# Patient Record
Sex: FEMALE | Race: WHITE | Employment: OTHER | URBAN - METROPOLITAN AREA
[De-identification: names, ages, dates, MRNs, and addresses within clinical notes are randomized per-mention and may not be internally consistent; named-entity substitution may affect disease eponyms.]

---

## 2021-10-27 ENCOUNTER — TRANSCRIBE ORDER (OUTPATIENT)
Dept: SCHEDULING | Age: 71
End: 2021-10-27

## 2021-10-27 DIAGNOSIS — Z72.0 TOBACCO ABUSE: Primary | ICD-10-CM

## 2021-10-27 DIAGNOSIS — Z85.3 HX OF BREAST CANCER: Primary | ICD-10-CM

## 2021-10-27 DIAGNOSIS — Z12.2 SCREENING FOR LUNG CANCER: ICD-10-CM

## 2021-10-27 DIAGNOSIS — Z12.31 ENCOUNTER FOR SCREENING MAMMOGRAM FOR MALIGNANT NEOPLASM OF BREAST: ICD-10-CM

## 2021-10-28 ENCOUNTER — TRANSCRIBE ORDER (OUTPATIENT)
Dept: SCHEDULING | Age: 71
End: 2021-10-28

## 2021-10-28 DIAGNOSIS — Z72.0 TOBACCO ABUSE: Primary | ICD-10-CM

## 2021-10-28 DIAGNOSIS — Z12.2 SCREENING FOR MALIGNANT NEOPLASM OF RESPIRATORY ORGAN: ICD-10-CM

## 2021-12-21 ENCOUNTER — HOSPITAL ENCOUNTER (OUTPATIENT)
Dept: MAMMOGRAPHY | Age: 71
Discharge: HOME | End: 2021-12-21
Attending: FAMILY MEDICINE
Payer: MEDICARE

## 2021-12-21 ENCOUNTER — HOSPITAL ENCOUNTER (OUTPATIENT)
Dept: CT IMAGING | Age: 71
Discharge: HOME | End: 2021-12-21
Attending: FAMILY MEDICINE
Payer: MEDICARE

## 2021-12-21 VITALS — HEIGHT: 61 IN | BODY MASS INDEX: 29.83 KG/M2 | WEIGHT: 158 LBS

## 2021-12-21 DIAGNOSIS — Z12.2 SCREENING FOR MALIGNANT NEOPLASM OF RESPIRATORY ORGAN: ICD-10-CM

## 2021-12-21 DIAGNOSIS — Z72.0 TOBACCO ABUSE: ICD-10-CM

## 2021-12-21 DIAGNOSIS — Z12.31 ENCOUNTER FOR SCREENING MAMMOGRAM FOR MALIGNANT NEOPLASM OF BREAST: ICD-10-CM

## 2021-12-21 DIAGNOSIS — Z85.3 HX OF BREAST CANCER: ICD-10-CM

## 2021-12-21 PROCEDURE — 71271 CT THORAX LUNG CANCER SCR C-: CPT

## 2021-12-21 PROCEDURE — 77063 BREAST TOMOSYNTHESIS BI: CPT

## 2022-01-31 ENCOUNTER — TRANSCRIBE ORDER (OUTPATIENT)
Dept: GENERAL RADIOLOGY | Age: 72
End: 2022-01-31

## 2022-01-31 ENCOUNTER — HOSPITAL ENCOUNTER (OUTPATIENT)
Dept: GENERAL RADIOLOGY | Age: 72
Discharge: HOME | End: 2022-01-31

## 2022-01-31 DIAGNOSIS — M79.651 RIGHT THIGH PAIN: ICD-10-CM

## 2022-01-31 DIAGNOSIS — M79.651 RIGHT THIGH PAIN: Primary | ICD-10-CM

## 2022-04-07 ENCOUNTER — OFFICE VISIT (OUTPATIENT)
Dept: CARDIOLOGY CLINIC | Age: 72
End: 2022-04-07
Payer: MEDICARE

## 2022-04-07 ENCOUNTER — TELEPHONE (OUTPATIENT)
Dept: CARDIOLOGY CLINIC | Age: 72
End: 2022-04-07

## 2022-04-07 VITALS
OXYGEN SATURATION: 96 % | WEIGHT: 165 LBS | HEIGHT: 61 IN | SYSTOLIC BLOOD PRESSURE: 120 MMHG | BODY MASS INDEX: 31.15 KG/M2 | HEART RATE: 88 BPM | RESPIRATION RATE: 18 BRPM | DIASTOLIC BLOOD PRESSURE: 76 MMHG

## 2022-04-07 DIAGNOSIS — E78.2 MIXED HYPERLIPIDEMIA: Primary | ICD-10-CM

## 2022-04-07 DIAGNOSIS — I44.7 LBBB (LEFT BUNDLE BRANCH BLOCK): ICD-10-CM

## 2022-04-07 PROCEDURE — 99204 OFFICE O/P NEW MOD 45 MIN: CPT | Performed by: SPECIALIST

## 2022-04-07 PROCEDURE — 1090F PRES/ABSN URINE INCON ASSESS: CPT | Performed by: SPECIALIST

## 2022-04-07 RX ORDER — OMEPRAZOLE 20 MG/1
CAPSULE, DELAYED RELEASE ORAL
COMMUNITY
Start: 2022-04-04

## 2022-04-07 RX ORDER — IBUPROFEN 200 MG
200 CAPSULE ORAL AS NEEDED
COMMUNITY

## 2022-04-07 RX ORDER — BUPROPION HYDROCHLORIDE 300 MG/1
TABLET ORAL
COMMUNITY
Start: 2022-01-19

## 2022-04-07 RX ORDER — ATORVASTATIN CALCIUM 10 MG/1
20 TABLET, FILM COATED ORAL DAILY
COMMUNITY
Start: 2022-01-19

## 2022-04-07 RX ORDER — ACETAMINOPHEN 500 MG
500 TABLET ORAL AS NEEDED
COMMUNITY

## 2022-04-07 RX ORDER — ESCITALOPRAM OXALATE 10 MG/1
TABLET ORAL
COMMUNITY
Start: 2022-01-19

## 2022-04-07 NOTE — PROGRESS NOTES
HISTORY OF PRESENT ILLNESS  Lexie Stanley is a 67 y.o. female     SUMMARY:   Problem List  Date Reviewed: 4/7/2022          Codes Class Noted    Mixed hyperlipidemia ICD-10-CM: E78.2  ICD-9-CM: 272.2  4/7/2022              Current Outpatient Medications on File Prior to Visit   Medication Sig    acetaminophen (TYLENOL) 500 mg tablet Take 500 mg by mouth as needed.  atorvastatin (LIPITOR) 10 mg tablet Take 20 mg by mouth daily.  buPROPion XL (WELLBUTRIN XL) 300 mg XL tablet     escitalopram oxalate (LEXAPRO) 10 mg tablet     omeprazole (PRILOSEC) 20 mg capsule     ibuprofen 200 mg cap Take 200 mg by mouth as needed. No current facility-administered medications on file prior to visit. CARDIOLOGY STUDIES TO DATE:  No specialty comments available. Chief Complaint   Patient presents with    New Patient     HPI :  She is referred by her primary care for cardiac evaluation. About 5 or 6 years ago when they were living in [place] she was found to have a left bundle branch block and it sounds like they did an echocardiogram and a Lexiscan on her all of which was normal.  She has never had any cardiac type symptoms. She recently had a low-dose CT of the chest for lung cancer screening and the report indicated she had some epicardial fat and minimal coronary calcification. She is always been a light smoker currently smokes about 2 cigarettes a day. There is no history hypertension or diabetes she has hyperlipidemia and family history is negative for premature coronary disease. She gets some 20 to 30 minutes of walking about 3 times a week with no problems suggestive of angina or heart failure. She does have known emphysema and if she pushes herself she will get short of breath.   CARDIAC ROS:   negative for chest pain, palpitations, syncope, orthopnea, paroxysmal nocturnal dyspnea, exertional chest pressure/discomfort, claudication, lower extremity edema    Family History   Problem Relation Age of Onset    Heart Disease Sister         afib    Heart Failure Brother         transplant       Past Medical History:   Diagnosis Date    Breast cancer ([facility] [address] 1996    LEFT       GENERAL ROS:  A comprehensive review of systems was negative except for that written in the HPI. Visit Vitals  /76 (BP 1 Location: Left upper arm, BP Patient Position: Sitting, BP Cuff Size: Adult)   Pulse 88   Resp 18   Ht 5' 1\" (1.549 m)   Wt 165 lb (74.8 kg)   SpO2 96%   BMI 31.18 kg/m²       Wt Readings from Last 3 Encounters:   04/07/22 165 lb (74.8 kg)   12/21/21 158 lb (71.7 kg)            BP Readings from Last 3 Encounters:   04/07/22 120/76       PHYSICAL EXAM  General appearance: alert, cooperative, no distress, appears stated age  Neurologic: Alert and oriented X 3  Neck: supple, symmetrical, trachea midline, no adenopathy, no carotid bruit and no JVD  Lungs: clear to auscultation bilaterally  Heart: regular rate and rhythm, S1, S2 normal, no murmur, click, rub or gallop  Abdomen: soft, non-tender. Bowel sounds normal. No masses,  no organomegaly  Extremities: extremities normal, atraumatic, no cyanosis or edema  Pulses: 2+ and symmetric      ASSESSMENT :      She is stable and I think asymptomatic from a cardiac perspective with minimal risk factors and no worrisome symptoms. At this point I do not think she needs any specific cardiac testing. We will try to get some information from her primary care. We talked at length about symptoms that would prompt an urgent return visit here or call to 911. current treatment plan is effective, no change in therapy  lab results and schedule of future lab studies reviewed with patient  reviewed diet, exercise and weight control    Encounter Diagnoses   Name Primary?     Mixed hyperlipidemia Yes    LBBB (left bundle branch block)      Orders Placed This Encounter    acetaminophen (TYLENOL) 500 mg tablet    atorvastatin (LIPITOR) 10 mg tablet    buPROPion XL (WELLBUTRIN XL) 300 mg XL tablet    escitalopram oxalate (LEXAPRO) 10 mg tablet    omeprazole (PRILOSEC) 20 mg capsule    ibuprofen 200 mg cap       Follow-up and Dispositions    · Return if symptoms worsen or fail to improve. Anthony Alegre MD  4/7/2022  Please note that this dictation was completed with [employer], the computer voice recognition software. Quite often unanticipated grammatical, syntax, homophones, and other interpretive errors are inadvertently transcribed by the computer software. Please disregard these errors. Please excuse any errors that have escaped final proofreading. Thank you.

## 2022-04-11 PROBLEM — E78.2 MIXED HYPERLIPIDEMIA: Status: ACTIVE | Noted: 2022-04-07

## 2022-08-29 ENCOUNTER — HOSPITAL ENCOUNTER (EMERGENCY)
Age: 72
Discharge: HOME | End: 2022-08-29
Attending: STUDENT IN AN ORGANIZED HEALTH CARE EDUCATION/TRAINING PROGRAM
Payer: MEDICARE

## 2022-08-29 ENCOUNTER — APPOINTMENT (OUTPATIENT)
Dept: GENERAL RADIOLOGY | Age: 72
End: 2022-08-29
Attending: EMERGENCY MEDICINE
Payer: MEDICARE

## 2022-08-29 VITALS
WEIGHT: 165 LBS | HEIGHT: 62 IN | RESPIRATION RATE: 18 BRPM | SYSTOLIC BLOOD PRESSURE: 98 MMHG | DIASTOLIC BLOOD PRESSURE: 73 MMHG | HEART RATE: 93 BPM | OXYGEN SATURATION: 94 % | TEMPERATURE: 98 F | BODY MASS INDEX: 30.36 KG/M2

## 2022-08-29 DIAGNOSIS — I44.7 LEFT BUNDLE BRANCH BLOCK: ICD-10-CM

## 2022-08-29 DIAGNOSIS — R07.9 ACUTE CHEST PAIN: Primary | ICD-10-CM

## 2022-08-29 LAB
ALBUMIN SERPL-MCNC: 3.4 G/DL (ref 3.5–5)
ALBUMIN/GLOB SERPL: 0.9 {RATIO} (ref 1.1–2.2)
ALP SERPL-CCNC: 81 U/L (ref 45–117)
ALT SERPL-CCNC: 13 U/L (ref 12–78)
ANION GAP SERPL CALC-SCNC: 2 MMOL/L (ref 5–15)
AST SERPL-CCNC: 17 U/L (ref 15–37)
ATRIAL RATE: 72 BPM
BASOPHILS # BLD: 0.1 K/UL (ref 0–0.1)
BASOPHILS NFR BLD: 1 % (ref 0–1)
BILIRUB SERPL-MCNC: 0.5 MG/DL (ref 0.2–1)
BNP SERPL-MCNC: 86 PG/ML
BUN SERPL-MCNC: 11 MG/DL (ref 6–20)
BUN/CREAT SERPL: 16 (ref 12–20)
CALCIUM SERPL-MCNC: 8.8 MG/DL (ref 8.5–10.1)
CALCULATED P AXIS, ECG09: 70 DEGREES
CALCULATED R AXIS, ECG10: -21 DEGREES
CALCULATED T AXIS, ECG11: 105 DEGREES
CHLORIDE SERPL-SCNC: 107 MMOL/L (ref 97–108)
CO2 SERPL-SCNC: 31 MMOL/L (ref 21–32)
CREAT SERPL-MCNC: 0.67 MG/DL (ref 0.55–1.02)
DIAGNOSIS, 93000: NORMAL
DIFFERENTIAL METHOD BLD: NORMAL
EOSINOPHIL # BLD: 0.1 K/UL (ref 0–0.4)
EOSINOPHIL NFR BLD: 2 % (ref 0–7)
ERYTHROCYTE [DISTWIDTH] IN BLOOD BY AUTOMATED COUNT: 13.2 % (ref 11.5–14.5)
GLOBULIN SER CALC-MCNC: 3.8 G/DL (ref 2–4)
GLUCOSE SERPL-MCNC: 94 MG/DL (ref 65–100)
HCT VFR BLD AUTO: 40.5 % (ref 35–47)
HGB BLD-MCNC: 12.9 G/DL (ref 11.5–16)
IMM GRANULOCYTES # BLD AUTO: 0 K/UL (ref 0–0.04)
IMM GRANULOCYTES NFR BLD AUTO: 0 % (ref 0–0.5)
LYMPHOCYTES # BLD: 1.9 K/UL (ref 0.8–3.5)
LYMPHOCYTES NFR BLD: 27 % (ref 12–49)
MCH RBC QN AUTO: 29.9 PG (ref 26–34)
MCHC RBC AUTO-ENTMCNC: 31.9 G/DL (ref 30–36.5)
MCV RBC AUTO: 93.8 FL (ref 80–99)
MONOCYTES # BLD: 0.5 K/UL (ref 0–1)
MONOCYTES NFR BLD: 8 % (ref 5–13)
NEUTS SEG # BLD: 4.4 K/UL (ref 1.8–8)
NEUTS SEG NFR BLD: 62 % (ref 32–75)
NRBC # BLD: 0 K/UL (ref 0–0.01)
NRBC BLD-RTO: 0 PER 100 WBC
P-R INTERVAL, ECG05: 148 MS
PLATELET # BLD AUTO: 286 K/UL (ref 150–400)
PMV BLD AUTO: 9.9 FL (ref 8.9–12.9)
POTASSIUM SERPL-SCNC: 4 MMOL/L (ref 3.5–5.1)
PROT SERPL-MCNC: 7.2 G/DL (ref 6.4–8.2)
Q-T INTERVAL, ECG07: 436 MS
QRS DURATION, ECG06: 142 MS
QTC CALCULATION (BEZET), ECG08: 477 MS
RBC # BLD AUTO: 4.32 M/UL (ref 3.8–5.2)
SODIUM SERPL-SCNC: 140 MMOL/L (ref 136–145)
TROPONIN-HIGH SENSITIVITY: 5 NG/L (ref 0–51)
VENTRICULAR RATE, ECG03: 72 BPM
WBC # BLD AUTO: 7.1 K/UL (ref 3.6–11)

## 2022-08-29 PROCEDURE — 85025 COMPLETE CBC W/AUTO DIFF WBC: CPT

## 2022-08-29 PROCEDURE — 71046 X-RAY EXAM CHEST 2 VIEWS: CPT

## 2022-08-29 PROCEDURE — 99285 EMERGENCY DEPT VISIT HI MDM: CPT

## 2022-08-29 PROCEDURE — 84484 ASSAY OF TROPONIN QUANT: CPT

## 2022-08-29 PROCEDURE — 93005 ELECTROCARDIOGRAM TRACING: CPT

## 2022-08-29 PROCEDURE — 80053 COMPREHEN METABOLIC PANEL: CPT

## 2022-08-29 PROCEDURE — 36415 COLL VENOUS BLD VENIPUNCTURE: CPT

## 2022-08-29 PROCEDURE — 83880 ASSAY OF NATRIURETIC PEPTIDE: CPT

## 2022-08-29 NOTE — ED PROVIDER NOTES
EMERGENCY DEPARTMENT HISTORY AND PHYSICAL EXAM      Date: 8/29/2022  Patient Name: Lazara Lara    History of Presenting Illness     Chief Complaint   Patient presents with    Chest Pain     Reports acute onset of chest pain that radiated from center of her back that happened about an hour ago at her PCP office. Denied currently feeling SOB or dizziness. Hx of GERD. History Provided By: Patient    HPI: Lazara Lara, 67 y.o. female with a past medical history significant for lipidemia, left bundle branch block presents to the ED with cc of pain. She notes she was at a doctor's appointment approximate 10:00 this morning and had CAD acute onset epigastric pain rating into her back with associated shortness of breath. The pain is constant and last for 30 minutes and then went away on its own. She notes no complaints at the moment. She says she had this approximately 30 years ago but nothing recently. She has seen cardiologist in the past and has a known left bundle branch block. She was referred to emergency department for evaluation. She notes she has been feeling okay. She was recently diagnosed with GERD and is on omeprazole which is helping. She denies any current chest pain, shortness of breath, cough, palpitations. Smokes 3 cigarettes/day and does not drink or do drugs. There are no other complaints, changes, or physical findings at this time. PCP: Andra Espino MD    No current facility-administered medications on file prior to encounter. Current Outpatient Medications on File Prior to Encounter   Medication Sig Dispense Refill    acetaminophen (TYLENOL) 500 mg tablet Take 500 mg by mouth as needed. atorvastatin (LIPITOR) 10 mg tablet Take 20 mg by mouth daily. buPROPion XL (WELLBUTRIN XL) 300 mg XL tablet       escitalopram oxalate (LEXAPRO) 10 mg tablet       omeprazole (PRILOSEC) 20 mg capsule       ibuprofen 200 mg cap Take 200 mg by mouth as needed.          Past History     Past Medical History:  Past Medical History:   Diagnosis Date    Breast cancer ([facility] [address] 1996    LEFT       Past Surgical History:  Past Surgical History:   Procedure Laterality Date    HX BREAST REDUCTION Left     HX MASTECTOMY Left 1996       Family History:  Family History   Problem Relation Age of Onset    Heart Disease Sister         afib    Heart Failure Brother         transplant       Social History:  Social History     Tobacco Use    Smoking status: Light Smoker     Years: 40.00     Types: Cigarettes    Smokeless tobacco: Never    Tobacco comments:     2 cig a day   Substance Use Topics    Alcohol use: Not Currently    Drug use: Never       Allergies: Allergies   Allergen Reactions    Adhesive Unknown (comments)     Other reaction(s): Itching, Redness    Epicoccum Nigrum Allergenic Extract Unable to Obtain     FROM ALLERGIST    Fusarium Oxysporum Allergenic Extract Unknown (comments)     PER ALLERGIST FUSARIUM VASINFECTUM    Pecan Nut Unknown (comments)     Pecan tree         Review of Systems   Review of Systems   Constitutional:  Negative for fever. HENT:  Negative for congestion and sore throat. Eyes:  Negative for visual disturbance. Respiratory:  Positive for shortness of breath. Negative for cough. Cardiovascular:  Positive for chest pain. Gastrointestinal:  Negative for abdominal pain, blood in stool, constipation, diarrhea, nausea and vomiting. Genitourinary:  Negative for dysuria. Musculoskeletal:  Negative for myalgias. Skin:  Negative for rash. Neurological:  Negative for dizziness and headaches. Physical Exam   Physical Exam  Vitals reviewed. Constitutional:       Appearance: Normal appearance. HENT:      Head: Normocephalic and atraumatic. Nose: Nose normal.      Mouth/Throat:      Mouth: Mucous membranes are moist.   Eyes:      Extraocular Movements: Extraocular movements intact.       Conjunctiva/sclera: Conjunctivae normal.      Pupils: Pupils are equal, round, and reactive to light. Cardiovascular:      Rate and Rhythm: Normal rate and regular rhythm. Heart sounds: Normal heart sounds. Pulmonary:      Effort: Pulmonary effort is normal.      Breath sounds: Normal breath sounds. Abdominal:      General: Abdomen is flat. Palpations: Abdomen is soft. Musculoskeletal:         General: No swelling or deformity. Normal range of motion. Cervical back: Normal range of motion. Skin:     General: Skin is warm. Neurological:      General: No focal deficit present. Mental Status: She is alert and oriented to person, place, and time. Mental status is at baseline. Psychiatric:         Mood and Affect: Mood normal.         Thought Content: Thought content normal.       Diagnostic Study Results     Labs -     Recent Results (from the past 24 hour(s))   CBC WITH AUTOMATED DIFF    Collection Time: 08/29/22  1:23 PM   Result Value Ref Range    WBC 7.1 3.6 - 11.0 K/uL    RBC 4.32 3.80 - 5.20 M/uL    HGB 12.9 11.5 - 16.0 g/dL    HCT 40.5 35.0 - 47.0 %    MCV 93.8 80.0 - 99.0 FL    MCH 29.9 26.0 - 34.0 PG    MCHC 31.9 30.0 - 36.5 g/dL    RDW 13.2 11.5 - 14.5 %    PLATELET 620 295 - 055 K/uL    MPV 9.9 8.9 - 12.9 FL    NRBC 0.0 0  WBC    ABSOLUTE NRBC 0.00 0.00 - 0.01 K/uL    NEUTROPHILS 62 32 - 75 %    LYMPHOCYTES 27 12 - 49 %    MONOCYTES 8 5 - 13 %    EOSINOPHILS 2 0 - 7 %    BASOPHILS 1 0 - 1 %    IMMATURE GRANULOCYTES 0 0.0 - 0.5 %    ABS. NEUTROPHILS 4.4 1.8 - 8.0 K/UL    ABS. LYMPHOCYTES 1.9 0.8 - 3.5 K/UL    ABS. MONOCYTES 0.5 0.0 - 1.0 K/UL    ABS. EOSINOPHILS 0.1 0.0 - 0.4 K/UL    ABS. BASOPHILS 0.1 0.0 - 0.1 K/UL    ABS. IMM.  GRANS. 0.0 0.00 - 0.04 K/UL    DF AUTOMATED     METABOLIC PANEL, COMPREHENSIVE    Collection Time: 08/29/22  1:23 PM   Result Value Ref Range    Sodium 140 136 - 145 mmol/L    Potassium 4.0 3.5 - 5.1 mmol/L    Chloride 107 97 - 108 mmol/L    CO2 31 21 - 32 mmol/L    Anion gap 2 (L) 5 - 15 mmol/L    Glucose 94 65 - 100 mg/dL    BUN 11 6 - 20 MG/DL    Creatinine 0.67 0.55 - 1.02 MG/DL    BUN/Creatinine ratio 16 12 - 20      GFR est AA >60 >60 ml/min/1.73m2    GFR est non-AA >60 >60 ml/min/1.73m2    Calcium 8.8 8.5 - 10.1 MG/DL    Bilirubin, total 0.5 0.2 - 1.0 MG/DL    ALT (SGPT) 13 12 - 78 U/L    AST (SGOT) 17 15 - 37 U/L    Alk. phosphatase 81 45 - 117 U/L    Protein, total 7.2 6.4 - 8.2 g/dL    Albumin 3.4 (L) 3.5 - 5.0 g/dL    Globulin 3.8 2.0 - 4.0 g/dL    A-G Ratio 0.9 (L) 1.1 - 2.2     NT-PRO BNP    Collection Time: 08/29/22  1:23 PM   Result Value Ref Range    NT pro-BNP 86 <125 PG/ML   TROPONIN-HIGH SENSITIVITY    Collection Time: 08/29/22  1:23 PM   Result Value Ref Range    Troponin-High Sensitivity 5 0 - 51 ng/L       Radiologic Studies -   XR CHEST PA LAT   Final Result   No acute cardiopulmonary disease. CT Results  (Last 48 hours)      None          CXR Results  (Last 48 hours)                 08/29/22 1246  XR CHEST PA LAT Final result    Impression:  No acute cardiopulmonary disease. Narrative: Indication:  Chest Pain        Exam: PA and lateral views of the chest.       Direct comparison is made to prior CT dated December 2021. Findings: Cardiomediastinal silhouette is within normal limits. Lungs are clear   bilaterally. Pleural spaces are normal. Osseous structures are intact. Multiple   surgical clips are noted within the left breast.                     Medical Decision Making   I am the first provider for this patient. I reviewed the vital signs, available nursing notes, past medical history, past surgical history, family history and social history. Vital Signs-Reviewed the patient's vital signs.   Patient Vitals for the past 12 hrs:   Temp Pulse Resp BP SpO2   08/29/22 1214 98 °F (36.7 °C) 93 18 98/73 94 %       Records Reviewed: Nursing records and medical records reviewed    MDM:  Excludes ACS, MI, esophageal spasm, Prinzmetal's angina, angina    Provider Notes (Medical Decision Making):   69-year-old female with history of GERD, hyperlipidemia presents with resolved chest pain. Her vital signs are stable upon arrival.  She looks well on arrival and is in no acute distress. She does have a left bundle branch block on her EKG which is old. She notes she has seen cardiologist for this is a stable and nothing to do. Her chest pain is since resolved and is not worse with exertion. Suspicion for ACS or MI is low. Possible Prinzmetal's angina. Will refer to cardiology here for follow-up. Her labs are within normal limits require no acute intervention. She agrees with this plan. ED Course:   Initial assessment performed. The patients presenting problems have been discussed, and they are in agreement with the care plan formulated and outlined with them. I have encouraged them to ask questions as they arise throughout their visit. Disposition:  Discharge Note:  3:45 PM  The patient has been re-evaluated and is ready for discharge. Reviewed available results with patient. Counseled patient on diagnosis and care plan. Patient has expressed understanding, and all questions have been answered. Patient agrees with plan and agrees to follow up as recommended, or to return to the ED if their symptoms worsen. Discharge instructions have been provided and explained to the patient, along with reasons to return to the ED. DISCHARGE PLAN:  1. Current Discharge Medication List        2. Follow-up Information       Follow up With Specialties Details Why Contact Info    [facility]  Call   [address]   [phone]    [facility] EMERGENCY DEPT Emergency Medicine  If symptoms worsen [address]  [address] [facility]  [phone]          3. Return to ED if worse     Diagnosis     Clinical Impression:   1. Acute chest pain    2.  Left bundle branch block        Attestations:    Charlotte Choudhury MD    Please note that this dictation was completed with [employer], the [employer] voice recognition software. Quite often unanticipated grammatical, syntax, homophones, and other interpretive errors are inadvertently transcribed by the computer software. Please disregard these errors. Please excuse any errors that have escaped final proofreading. Thank you.

## 2023-01-06 ENCOUNTER — TRANSCRIBE ORDER (OUTPATIENT)
Dept: SCHEDULING | Age: 73
End: 2023-01-06

## 2023-01-06 DIAGNOSIS — N63.13 MASS OF LOWER OUTER QUADRANT OF RIGHT BREAST: Primary | ICD-10-CM

## 2023-01-06 DIAGNOSIS — Z00.01 ENCOUNTER FOR GENERAL ADULT MEDICAL EXAMINATION WITH ABNORMAL FINDINGS: ICD-10-CM

## 2023-01-25 ENCOUNTER — HOSPITAL ENCOUNTER (OUTPATIENT)
Dept: MAMMOGRAPHY | Age: 73
Discharge: HOME | End: 2023-01-25
Payer: MEDICARE

## 2023-01-25 DIAGNOSIS — N63.13 MASS OF LOWER OUTER QUADRANT OF RIGHT BREAST: ICD-10-CM

## 2023-01-25 PROCEDURE — 77061 BREAST TOMOSYNTHESIS UNI: CPT

## 2023-01-31 ENCOUNTER — TRANSCRIBE ORDER (OUTPATIENT)
Dept: SCHEDULING | Age: 73
End: 2023-01-31

## 2023-01-31 DIAGNOSIS — R91.8 PULMONARY NODULES: ICD-10-CM

## 2023-01-31 DIAGNOSIS — J43.1 PANLOBULAR EMPHYSEMA (HCC): ICD-10-CM

## 2023-01-31 DIAGNOSIS — Z72.0 TOBACCO ABUSE: Primary | ICD-10-CM

## 2023-01-31 DIAGNOSIS — Z00.01 ENCOUNTER FOR GENERAL ADULT MEDICAL EXAMINATION WITH ABNORMAL FINDINGS: ICD-10-CM

## 2023-02-01 ENCOUNTER — TRANSCRIBE ORDER (OUTPATIENT)
Dept: SCHEDULING | Age: 73
End: 2023-02-01

## 2023-02-01 DIAGNOSIS — M85.89 OSTEOPENIA OF MULTIPLE SITES: Primary | ICD-10-CM

## 2023-02-02 ENCOUNTER — TRANSCRIBE ORDER (OUTPATIENT)
Dept: SCHEDULING | Age: 73
End: 2023-02-02

## 2023-02-02 DIAGNOSIS — J43.1 PANLOBULAR EMPHYSEMA (HCC): ICD-10-CM

## 2023-02-02 DIAGNOSIS — Z72.0 TOBACCO ABUSE: Primary | ICD-10-CM

## 2023-02-02 DIAGNOSIS — R91.8 PULMONARY NODULES: ICD-10-CM

## 2023-02-14 ENCOUNTER — TRANSCRIBE ORDER (OUTPATIENT)
Dept: SCHEDULING | Age: 73
End: 2023-02-14

## 2023-02-14 DIAGNOSIS — J43.1 PANACINAR EMPHYSEMA (HCC): ICD-10-CM

## 2023-02-14 DIAGNOSIS — Z72.0 TOBACCO ABUSE: Primary | ICD-10-CM

## 2023-02-14 DIAGNOSIS — R91.8 LUNG MASS: ICD-10-CM

## 2023-02-16 ENCOUNTER — TRANSCRIBE ORDER (OUTPATIENT)
Dept: SCHEDULING | Age: 73
End: 2023-02-16

## 2023-02-16 DIAGNOSIS — R91.8 PULMONARY NODULES: Primary | ICD-10-CM

## 2023-02-20 ENCOUNTER — TRANSCRIBE ORDER (OUTPATIENT)
Dept: SCHEDULING | Age: 73
End: 2023-02-20

## 2023-02-20 DIAGNOSIS — F17.219 CIGARETTE NICOTINE DEPENDENCE WITH NICOTINE-INDUCED DISORDER: Primary | ICD-10-CM

## 2023-02-20 DIAGNOSIS — Z72.0 TOBACCO ABUSE: ICD-10-CM

## 2023-02-20 DIAGNOSIS — F17.200 NICOTINE DEPENDENCE: ICD-10-CM

## 2023-02-20 DIAGNOSIS — R91.8 PULMONARY NODULES: Primary | ICD-10-CM

## 2023-02-20 DIAGNOSIS — F17.200 CURRENT SMOKER: ICD-10-CM

## 2023-03-06 ENCOUNTER — HOSPITAL ENCOUNTER (OUTPATIENT)
Dept: CT IMAGING | Age: 73
Discharge: HOME | End: 2023-03-06
Attending: FAMILY MEDICINE
Payer: MEDICARE

## 2023-03-06 DIAGNOSIS — F17.219 CIGARETTE NICOTINE DEPENDENCE WITH NICOTINE-INDUCED DISORDER: ICD-10-CM

## 2023-03-06 PROCEDURE — 71271 CT THORAX LUNG CANCER SCR C-: CPT

## 2023-04-07 ENCOUNTER — HOSPITAL ENCOUNTER (OUTPATIENT)
Dept: MAMMOGRAPHY | Age: 73
End: 2023-04-07
Payer: MEDICARE

## 2023-04-22 DIAGNOSIS — M85.89 OSTEOPENIA OF MULTIPLE SITES: Primary | ICD-10-CM

## 2024-02-08 ENCOUNTER — HOSPITAL ENCOUNTER (OUTPATIENT)
Facility: HOSPITAL | Age: 74
Discharge: HOME | End: 2024-02-08
Payer: MEDICARE

## 2024-02-08 VITALS — HEIGHT: 62 IN | WEIGHT: 165 LBS | BODY MASS INDEX: 30.36 KG/M2

## 2024-02-08 DIAGNOSIS — Z12.31 SCREENING MAMMOGRAM FOR BREAST CANCER: ICD-10-CM

## 2024-02-08 PROCEDURE — 77063 BREAST TOMOSYNTHESIS BI: CPT

## 2024-03-26 ENCOUNTER — HOSPITAL ENCOUNTER (OUTPATIENT)
Facility: HOSPITAL | Age: 74
Discharge: HOME | End: 2024-03-29
Payer: MEDICARE

## 2024-03-26 DIAGNOSIS — F17.210 CIGARETTE NICOTINE DEPENDENCE, UNCOMPLICATED: ICD-10-CM

## 2024-03-26 DIAGNOSIS — Z87.891 PERSONAL HISTORY OF TOBACCO USE: ICD-10-CM

## 2024-03-26 PROCEDURE — 71271 CT THORAX LUNG CANCER SCR C-: CPT

## 2024-08-08 RX ORDER — SODIUM CHLORIDE 0.9 % (FLUSH) 0.9 %
5-40 SYRINGE (ML) INJECTION PRN
Status: DISCONTINUED | OUTPATIENT
Start: 2024-08-08 | End: 2024-08-09 | Stop reason: HOSPADM

## 2024-08-08 RX ORDER — SODIUM CHLORIDE 0.9 % (FLUSH) 0.9 %
5-40 SYRINGE (ML) INJECTION EVERY 12 HOURS SCHEDULED
Status: DISCONTINUED | OUTPATIENT
Start: 2024-08-08 | End: 2024-08-09 | Stop reason: HOSPADM

## 2024-08-08 RX ORDER — SODIUM CHLORIDE 9 MG/ML
INJECTION, SOLUTION INTRAVENOUS CONTINUOUS
Status: DISCONTINUED | OUTPATIENT
Start: 2024-08-08 | End: 2024-08-09 | Stop reason: HOSPADM

## 2024-08-08 RX ORDER — SODIUM CHLORIDE 9 MG/ML
25 INJECTION, SOLUTION INTRAVENOUS PRN
Status: DISCONTINUED | OUTPATIENT
Start: 2024-08-08 | End: 2024-08-09 | Stop reason: HOSPADM

## 2024-08-09 ENCOUNTER — ANESTHESIA (OUTPATIENT)
Facility: HOSPITAL | Age: 74
End: 2024-08-09
Payer: MEDICARE

## 2024-08-09 ENCOUNTER — ANESTHESIA EVENT (OUTPATIENT)
Facility: HOSPITAL | Age: 74
End: 2024-08-09
Payer: MEDICARE

## 2024-08-09 ENCOUNTER — HOSPITAL ENCOUNTER (OUTPATIENT)
Facility: HOSPITAL | Age: 74
Setting detail: OUTPATIENT SURGERY
Discharge: HOME | End: 2024-08-09
Attending: STUDENT IN AN ORGANIZED HEALTH CARE EDUCATION/TRAINING PROGRAM | Admitting: STUDENT IN AN ORGANIZED HEALTH CARE EDUCATION/TRAINING PROGRAM
Payer: MEDICARE

## 2024-08-09 VITALS
TEMPERATURE: 97 F | BODY MASS INDEX: 29.87 KG/M2 | SYSTOLIC BLOOD PRESSURE: 103 MMHG | HEIGHT: 61 IN | HEART RATE: 84 BPM | RESPIRATION RATE: 22 BRPM | WEIGHT: 158.2 LBS | OXYGEN SATURATION: 96 % | DIASTOLIC BLOOD PRESSURE: 54 MMHG

## 2024-08-09 PROCEDURE — 3700000000 HC ANESTHESIA ATTENDED CARE: Performed by: STUDENT IN AN ORGANIZED HEALTH CARE EDUCATION/TRAINING PROGRAM

## 2024-08-09 PROCEDURE — 7100000011 HC PHASE II RECOVERY - ADDTL 15 MIN: Performed by: STUDENT IN AN ORGANIZED HEALTH CARE EDUCATION/TRAINING PROGRAM

## 2024-08-09 PROCEDURE — 2500000003 HC RX 250 WO HCPCS

## 2024-08-09 PROCEDURE — 2580000003 HC RX 258: Performed by: STUDENT IN AN ORGANIZED HEALTH CARE EDUCATION/TRAINING PROGRAM

## 2024-08-09 PROCEDURE — 2709999900 HC NON-CHARGEABLE SUPPLY: Performed by: STUDENT IN AN ORGANIZED HEALTH CARE EDUCATION/TRAINING PROGRAM

## 2024-08-09 PROCEDURE — 88305 TISSUE EXAM BY PATHOLOGIST: CPT

## 2024-08-09 PROCEDURE — 3600007512: Performed by: STUDENT IN AN ORGANIZED HEALTH CARE EDUCATION/TRAINING PROGRAM

## 2024-08-09 PROCEDURE — 6360000002 HC RX W HCPCS

## 2024-08-09 PROCEDURE — 3700000001 HC ADD 15 MINUTES (ANESTHESIA): Performed by: STUDENT IN AN ORGANIZED HEALTH CARE EDUCATION/TRAINING PROGRAM

## 2024-08-09 PROCEDURE — 7100000010 HC PHASE II RECOVERY - FIRST 15 MIN: Performed by: STUDENT IN AN ORGANIZED HEALTH CARE EDUCATION/TRAINING PROGRAM

## 2024-08-09 PROCEDURE — 3600007502: Performed by: STUDENT IN AN ORGANIZED HEALTH CARE EDUCATION/TRAINING PROGRAM

## 2024-08-09 RX ORDER — PHENYLEPHRINE HCL IN 0.9% NACL 0.4MG/10ML
SYRINGE (ML) INTRAVENOUS PRN
Status: DISCONTINUED | OUTPATIENT
Start: 2024-08-09 | End: 2024-08-09 | Stop reason: SDUPTHER

## 2024-08-09 RX ORDER — LIDOCAINE HYDROCHLORIDE 20 MG/ML
INJECTION, SOLUTION EPIDURAL; INFILTRATION; INTRACAUDAL; PERINEURAL PRN
Status: DISCONTINUED | OUTPATIENT
Start: 2024-08-09 | End: 2024-08-09 | Stop reason: SDUPTHER

## 2024-08-09 RX ADMIN — Medication 40 MCG: at 08:41

## 2024-08-09 RX ADMIN — LIDOCAINE HYDROCHLORIDE 40 MG: 20 INJECTION, SOLUTION EPIDURAL; INFILTRATION; INTRACAUDAL; PERINEURAL at 08:36

## 2024-08-09 RX ADMIN — PROPOFOL 180 MG: 10 INJECTION, EMULSION INTRAVENOUS at 09:00

## 2024-08-09 RX ADMIN — SODIUM CHLORIDE: 9 INJECTION, SOLUTION INTRAVENOUS at 08:33

## 2024-08-09 RX ADMIN — PROPOFOL 100 MG: 10 INJECTION, EMULSION INTRAVENOUS at 08:36

## 2024-08-09 RX ADMIN — SODIUM CHLORIDE 25 ML: 9 INJECTION, SOLUTION INTRAVENOUS at 08:23

## 2024-08-09 ASSESSMENT — LIFESTYLE VARIABLES: SMOKING_STATUS: 1

## 2024-08-09 ASSESSMENT — PAIN - FUNCTIONAL ASSESSMENT: PAIN_FUNCTIONAL_ASSESSMENT: 0-10

## 2024-08-09 ASSESSMENT — PAIN DESCRIPTION - DESCRIPTORS: DESCRIPTORS: ACHING

## 2024-08-09 NOTE — DISCHARGE INSTRUCTIONS
[facility]  GRICEL Canales D.O.  [phone]            COLONOSCOPY DISCHARGE INSTRUCTIONS    Komal Merida  [id number]  1950    DISCOMFORT:  Redness at IV site- apply warm compress to area; if redness or soreness persist- contact your physician  There may be a slight amount of blood passed from the rectum  Gaseous discomfort- walking, belching will help relieve any discomfort  You may not operate a vehicle for 12 hours  You may not engage in an occupation involving machinery or appliances for the  rest of today  You may not drink alcoholic beverages for at least 12 hours  Avoid making any critical decisions for at least 24 hours    DIET:   You may resume your normal diet, but some patients find that heavy or large meals may lead to indigestion or vomiting. I suggest a light meal as first food intake.    ACTIVITY:  You may resume your normal daily activities.  It is recommended that you spend the remainder of the day resting - avoid any strenuous activity.    CALL M.D. IF ANY SIGN OF:   Increasing pain, nausea, vomiting  Abdominal distension (swelling)  Significant bleeding (oral or rectal)  Fever   Pain in chest area  Shortness of breath    Additional Instructions:   Call Dr. Canales if any questions or problems at [phone]   You should receive the biopsy results by phone or mail within 3 weeks, if not, call  my office for the results      Colonoscopy showed 4 small benign appearing polyps that were received.    Repeat screening colonoscopy not recommended due to age, but can be considered if any symptoms develop, I.e. rectal bleeding, anemia, diarrhea, change in bowel habits, etc.    Resume regular medications    Follow-up at your convenience.      GRICEL Canales D.O.  [facility]    Patient Education on Sedation / Analgesia Administered for Procedure      For 24 hours after general anesthesia or intravenous analgesia / sedation:  Have  someone responsible help you with your care  Limit your activities  Do not drive and operate hazardous machinery  Do not make important personal, legal or business decisions  Do not drink alcoholic beverages  If you have not urinated within 8 hours after discharge, please contact your physician  Resume your medications unless otherwise instructed    For 24 hours after general anesthesia or intravenous analgesia / sedation  you may experience:  Drowsiness, dizziness, sleepiness, or confusion  Difficulty remembering or delayed reaction times  Difficulty with your balance, especially while walking, move slowly and carefully, do not make sudden position changes  Difficulty focusing or blurred vision    You may not be aware of slight changes in your behavior and/or your reaction time because of the medication used during and after your procedure.    Report the following to your physician:  Excessive pain, swelling, redness or odor of or around the surgical area  Temperature over 100.5  Nausea and vomiting lasting longer than 4 hours or if unable to take medications  Any signs of decreased circulation or nerve impairment to extremity: change in color, persistent numbness, tingling, coldness or increase pain  Any questions or concerns    IF YOU REPORT TO AN EMERGENCY ROOM, DOCTOR'S OFFICE OR HOSPITAL WITHIN 24 HOURS AFTER YOUR PROCEDURE, BRING THIS SHEET AND YOUR AFTER VISIT SUMMARY WITH YOU AND GIVE IT TO THE PHYSICIAN OR NURSE ATTENDING YOU.

## 2024-08-09 NOTE — PROGRESS NOTES
Endoscope was pre-cleaned at bedside immediately following procedure by THOMAS Matthew    Glasses returned to patient post-procedure. Dentures remained in place intra-procedure.

## 2024-08-09 NOTE — ANESTHESIA PRE PROCEDURE
BMI:   Wt Readings from Last 3 Encounters:   02/08/24 74.8 kg (165 lb)   04/07/22 74.8 kg (165 lb)     There is no height or weight on file to calculate BMI.    CBC:   Lab Results   Component Value Date/Time    WBC 7.1 08/29/2022 01:23 PM    RBC 4.32 08/29/2022 01:23 PM    HGB 12.9 08/29/2022 01:23 PM    HCT 40.5 08/29/2022 01:23 PM    MCV 93.8 08/29/2022 01:23 PM    RDW 13.2 08/29/2022 01:23 PM     08/29/2022 01:23 PM       CMP:   Lab Results   Component Value Date/Time     08/29/2022 01:23 PM    K 4.0 08/29/2022 01:23 PM     08/29/2022 01:23 PM    CO2 31 08/29/2022 01:23 PM    BUN 11 08/29/2022 01:23 PM    CREATININE 0.67 08/29/2022 01:23 PM    GFRAA >60 08/29/2022 01:23 PM    AGRATIO 0.9 08/29/2022 01:23 PM    GLUCOSE 94 08/29/2022 01:23 PM    CALCIUM 8.8 08/29/2022 01:23 PM    BILITOT 0.5 08/29/2022 01:23 PM    ALKPHOS 81 08/29/2022 01:23 PM    AST 17 08/29/2022 01:23 PM    ALT 13 08/29/2022 01:23 PM       POC Tests: No results for input(s): \"POCGLU\", \"POCNA\", \"POCK\", \"POCCL\", \"POCBUN\", \"POCHEMO\", \"POCHCT\" in the last 72 hours.    Coags: No results found for: \"PROTIME\", \"INR\", \"APTT\"    HCG (If Applicable): No results found for: \"PREGTESTUR\", \"PREGSERUM\", \"HCG\", \"HCGQUANT\"     ABGs: No results found for: \"PHART\", \"PO2ART\", \"NQD5WBS\", \"HWB4LRD\", \"BEART\", \"S6GNAVIT\"     Type & Screen (If Applicable):  No results found for: \"LABABO\"    Drug/Infectious Status (If Applicable):  No results found for: \"HIV\", \"HEPCAB\"    COVID-19 Screening (If Applicable): No results found for: \"COVID19\"        Anesthesia Evaluation  Patient summary reviewed and Nursing notes reviewed  Airway: Mallampati: III       Mouth opening: > = 3 FB   Dental: normal exam         Pulmonary:Negative Pulmonary ROS and normal exam  breath sounds clear to auscultation  (+)           current smoker          Patient smoked on day of surgery.                 Cardiovascular:Negative CV ROS            Rhythm: regular  Rate:

## 2024-08-09 NOTE — ANESTHESIA POSTPROCEDURE EVALUATION
Department of Anesthesiology  Postprocedure Note    Patient: Komal Merida  MRN: [id number]  YOB: 1950  Date of evaluation: 8/9/2024    Procedure Summary       Date: 08/09/24 Room / Location: [facility] 01 / [facility] ENDOSCOPY    Anesthesia Start: 0833 Anesthesia Stop: 0902    Procedure: COLONOSCOPY Diagnosis:       Screen for colon cancer      Personal history of colonic polyps      Family history of colon cancer      (Screen for colon cancer [Z12.11])      (Personal history of colonic polyps [Z86.010])      (Family history of colon cancer [Z80.0])    Surgeons: Marlon Canales DO Responsible Provider: Thanh Persaud MD    Anesthesia Type: MAC ASA Status: 2            Anesthesia Type: MAC    Jc Phase I: Jc Score: 10    Jc Phase II: Jc Score: 10    Anesthesia Post Evaluation    Patient location during evaluation: PACU  Patient participation: complete - patient participated  Level of consciousness: sleepy but conscious and responsive to verbal stimuli  Pain score: 1  Airway patency: patent  Nausea & Vomiting: no vomiting and no nausea  Cardiovascular status: blood pressure returned to baseline and hemodynamically stable  Respiratory status: acceptable  Hydration status: stable  Multimodal analgesia pain management approach  Pain management: adequate    No notable events documented.

## 2024-08-09 NOTE — H&P
[facility]  GRICEL Canales D.O.  [phone]          Pre-procedure History and Physical       NAME:  Komal Merida   :   1950   MRN:   [id number]     CHIEF COMPLAINT/HPI: fam hx of CRC (2nd degree relative), screening CRC    74 y.o. female here for colonoscopy, screening  Last colonoscopy: 10y ago    PMH:  Past Medical History:   Diagnosis Date    Breast cancer (HCC) 1996    LEFT    Depression     GERD (gastroesophageal reflux disease)     Hyperlipidemia        PSH:  Past Surgical History:   Procedure Laterality Date    BREAST REDUCTION SURGERY Left     MASTECTOMY Left 1996    ROTATOR CUFF REPAIR Left 2019    SHOULDER ARTHROPLASTY Right 2017    debridement       Allergies:  Allergies   Allergen Reactions    Adhesive Tape      Other reaction(s): Unknown (comments)  Other reaction(s): Itching, Redness    Epicoccum Allergy Skin Test      Other reaction(s): Unable to Obtain  FROM ALLERGIST    Fusarium Allergy Skin Test      Other reaction(s): Unknown (comments)  PER ALLERGIST FUSARIUM VASINFECTUM    Pecan Extract      Other reaction(s): Unknown (comments)  Pecan tree       Home Medications:  Prior to Admission Medications   Prescriptions Last Dose Informant Patient Reported? Taking?   acetaminophen (TYLENOL) 500 MG tablet Past Week  Yes No   Sig: Take 1 tablet by mouth as needed   atorvastatin (LIPITOR) 10 MG tablet 2024  Yes No   Sig: Take 2 tablets by mouth daily   buPROPion (WELLBUTRIN XL) 300 MG extended release tablet 2024  Yes No   Sig: ceived the following from [facility]: Outside name: buPROPion XL (WELLBUTRIN XL) 300 mg XL tablet   ibuprofen (ADVIL;MOTRIN) 200 MG CAPS capsule Past Month  Yes No   Sig: Take 1 capsule by mouth as needed   omeprazole (PRILOSEC) 20 MG delayed release capsule 2024  Yes No   Sig: ceived the following from [facility]: Outside name: omeprazole (PRILOSEC) 20 mg capsule       Facility-Administered Medications: None       Hospital Medications:  Current Facility-Administered Medications   Medication Dose Route Frequency    0.9 % sodium chloride infusion   IntraVENous Continuous    sodium chloride flush 0.9 % injection 5-40 mL  5-40 mL IntraVENous 2 times per day    sodium chloride flush 0.9 % injection 5-40 mL  5-40 mL IntraVENous PRN    0.9 % sodium chloride infusion  25 mL IntraVENous PRN       Family History:  Family History   Problem Relation Age of Onset    Heart Disease Sister         afib    Heart Failure Brother         transplant       Social History:  Social History     Tobacco Use    Smoking status: Light Smoker     Current packs/day: 0.25     Types: Cigarettes    Smokeless tobacco: Never    Tobacco comments:     Quit smokin cig a day   Substance Use Topics    Alcohol use: Not Currently         PHYSICAL EXAM PRIOR TO SEDATION:  General: Alert, in no acute distress    Lungs:            Symmetric expansion, no accessory muscle use. No increased work of breathing.   Heart:  RRR    SKIN:   Dry, intact  Abdomen: Soft, Non distended, Non tender.        Assessment:   Stable for sedation administration.  74 y.o. female here for colonoscopy, screening  Last colonoscopy: 10y ago    Plan:     Endoscopic procedure with sedation  Colonoscopy +/- biopsy if needed (00879). Colonoscopy +/- polypectomy if needed (37058).     Signed By: GRICEL Canales D.O.  [facility]      2024  8:36 AM

## 2024-08-09 NOTE — OP NOTE
retrieval en bloc via cold snare polypectomy.     Terminal Ileum:   Normal    Specimen Removed:    ID Type Source Tests Collected by Time Destination   1 : Cecum Polyps Tissue Tissue SURGICAL PATHOLOGY Ally Marlon MATHIS, DO 8/9/2024 0850    2 : Transverse Colon Polyp Tissue Tissue SURGICAL PATHOLOGY Marlon Canales, DO 8/9/2024 0854    3 : Descending Colon Polyp Tissue Tissue SURGICAL PATHOLOGY Marlon Canales, DO 8/9/2024 0856        Complications: None.   EBL:  None.    Impression:    4 small benign appearing polyps, all <1cm in size.   Internal hemorrhoids.  Otherwise, unremarkable.     Recommendations:   - Resume regular diet  - Resume normal medications. Avoid NSAID medications for 48 hours. Avoid any blood thinners for 48 hours such as eliquis, xarelto, etc.   - Await pathology results. These should be available within the next 3 weeks- via a letter mailed to you for your records. If you do not hear from us, do not assume that everything is normal. Please call [phone] to inquire about your results.   -- Repeat screening colonoscopy not recommended due to age, but can be considered if any symptoms develop, I.e. rectal bleeding, anemia, diarrhea, change in bowel habits, etc.     - Follow-up with your primary care provider         Discharge Disposition:  Home in the company of a  when able to ambulate.      GRICEL Canales D.O.  [facility]

## 2024-08-09 NOTE — DISCHARGE SUMMARY
[facility]  GRICEL Canales D.O.  [phone]            COLONOSCOPY DISCHARGE INSTRUCTIONS    Komal Merida  [id number]  1950    DISCOMFORT:  Redness at IV site- apply warm compress to area; if redness or soreness persist- contact your physician  There may be a slight amount of blood passed from the rectum  Gaseous discomfort- walking, belching will help relieve any discomfort  You may not operate a vehicle for 12 hours  You may not engage in an occupation involving machinery or appliances for the  rest of today  You may not drink alcoholic beverages for at least 12 hours  Avoid making any critical decisions for at least 24 hours    DIET:   You may resume your normal diet, but some patients find that heavy or large meals may lead to indigestion or vomiting. I suggest a light meal as first food intake.    ACTIVITY:  You may resume your normal daily activities.  It is recommended that you spend the remainder of the day resting - avoid any strenuous activity.    CALL [facility] IF ANY SIGN OF:   Increasing pain, nausea, vomiting  Abdominal distension (swelling)  Significant bleeding (oral or rectal)  Fever   Pain in chest area  Shortness of breath    Additional Instructions:   Call Dr. Canales if any questions or problems at [phone]   You should receive the biopsy results by phone or mail within 3 weeks, if not, call  my office for the results      Colonoscopy showed 4 small benign appearing polyps that were received.    Repeat screening colonoscopy not recommended due to age, but can be considered if any symptoms develop, I.e. rectal bleeding, anemia, diarrhea, change in bowel habits, etc.    Resume regular medications    Follow-up at your convenience.      GRICEL Canales D.O.  [facility]

## 2024-08-09 NOTE — PROGRESS NOTES
ARRIVAL INFORMATION:  Verified patient name and date of birth, scheduled procedure, and informed consent.     : marlee Guallpa   contact number: [phone]  Physician and staff can share information with the .     Belongings with patient include:  Clothing,Glasses, Dentures upper    GI FOCUSED ASSESSMENT:  Neuro: Awake, alert, oriented x4  Respiratory: even and unlabored   GI: soft and non-distended  EKG Rhythm: normal sinus rhythm    Education:Reviewed general discharge instructions and  information.

## 2024-12-16 ENCOUNTER — HOSPITAL ENCOUNTER (OUTPATIENT)
Facility: HOSPITAL | Age: 74
Discharge: HOME | End: 2024-12-19
Payer: MEDICARE

## 2024-12-16 DIAGNOSIS — M54.50 ACUTE BILATERAL LOW BACK PAIN WITHOUT SCIATICA: ICD-10-CM

## 2024-12-16 PROCEDURE — 72100 X-RAY EXAM L-S SPINE 2/3 VWS: CPT

## 2025-01-30 ENCOUNTER — TRANSCRIBE ORDERS (OUTPATIENT)
Facility: HOSPITAL | Age: 75
End: 2025-01-30

## 2025-01-30 ENCOUNTER — CLINICAL DOCUMENTATION (OUTPATIENT)
Facility: HOSPITAL | Age: 75
End: 2025-01-30

## 2025-01-30 DIAGNOSIS — Z12.31 SCREENING MAMMOGRAM, ENCOUNTER FOR: Primary | ICD-10-CM

## 2025-02-17 ENCOUNTER — TRANSCRIBE ORDERS (OUTPATIENT)
Facility: HOSPITAL | Age: 75
End: 2025-02-17

## 2025-02-17 DIAGNOSIS — Z12.31 ENCOUNTER FOR SCREENING MAMMOGRAM FOR HIGH-RISK PATIENT: Primary | ICD-10-CM

## 2025-02-17 DIAGNOSIS — R91.8 PULMONARY NODULES: Primary | ICD-10-CM

## 2025-03-27 ENCOUNTER — HOSPITAL ENCOUNTER (OUTPATIENT)
Facility: HOSPITAL | Age: 75
Discharge: HOME | End: 2025-03-30
Payer: MEDICARE

## 2025-03-27 DIAGNOSIS — R91.8 PULMONARY NODULES: ICD-10-CM

## 2025-03-27 DIAGNOSIS — Z12.31 ENCOUNTER FOR SCREENING MAMMOGRAM FOR HIGH-RISK PATIENT: ICD-10-CM

## 2025-03-27 PROCEDURE — 71271 CT THORAX LUNG CANCER SCR C-: CPT

## 2025-03-27 PROCEDURE — 77063 BREAST TOMOSYNTHESIS BI: CPT

## 2025-04-07 ENCOUNTER — TRANSCRIBE ORDERS (OUTPATIENT)
Facility: HOSPITAL | Age: 75
End: 2025-04-07

## 2025-04-07 DIAGNOSIS — R93.89 ABNORMAL CHEST CT: ICD-10-CM

## 2025-04-07 DIAGNOSIS — J43.9 PULMONARY EMPHYSEMA, UNSPECIFIED EMPHYSEMA TYPE (HCC): Primary | ICD-10-CM

## 2025-04-07 DIAGNOSIS — R91.8 PULMONARY NODULES: ICD-10-CM

## 2025-06-16 ENCOUNTER — HOSPITAL ENCOUNTER (OUTPATIENT)
Facility: HOSPITAL | Age: 75
Discharge: HOME | End: 2025-06-19
Attending: FAMILY MEDICINE
Payer: MEDICARE

## 2025-06-16 DIAGNOSIS — R93.89 ABNORMAL CHEST CT: ICD-10-CM

## 2025-06-16 DIAGNOSIS — J43.9 PULMONARY EMPHYSEMA, UNSPECIFIED EMPHYSEMA TYPE (HCC): ICD-10-CM

## 2025-06-16 DIAGNOSIS — R91.8 PULMONARY NODULES: ICD-10-CM

## 2025-06-16 PROCEDURE — 6360000004 HC RX CONTRAST MEDICATION: Performed by: FAMILY MEDICINE

## 2025-06-16 PROCEDURE — 71260 CT THORAX DX C+: CPT

## 2025-06-16 RX ORDER — IOPAMIDOL 755 MG/ML
100 INJECTION, SOLUTION INTRAVASCULAR
Status: COMPLETED | OUTPATIENT
Start: 2025-06-16 | End: 2025-06-16

## 2025-06-16 RX ADMIN — IOPAMIDOL 100 ML: 755 INJECTION, SOLUTION INTRAVENOUS at 10:44

## (undated) DEVICE — CUFF BLD PRSS AD CLTH SGL TB W/ BAYNT CONN ROUNDED CORNER

## (undated) DEVICE — ENDOSCOPIC KIT COMPLIANCE ENDOKIT

## (undated) DEVICE — CONTAINER SPEC 20 ML LID NEUT BUFF FORMALIN 10 % POLYPR STS

## (undated) DEVICE — IV START KIT: Brand: MEDLINE